# Patient Record
Sex: FEMALE | Race: WHITE | Employment: FULL TIME | URBAN - METROPOLITAN AREA
[De-identification: names, ages, dates, MRNs, and addresses within clinical notes are randomized per-mention and may not be internally consistent; named-entity substitution may affect disease eponyms.]

---

## 2021-11-10 ENCOUNTER — HOSPITAL ENCOUNTER (OUTPATIENT)
Dept: SURGERY | Age: 51
Discharge: HOME | End: 2021-11-10
Attending: ORTHOPAEDIC SURGERY
Payer: COMMERCIAL

## 2021-11-10 VITALS
DIASTOLIC BLOOD PRESSURE: 89 MMHG | SYSTOLIC BLOOD PRESSURE: 174 MMHG | RESPIRATION RATE: 20 BRPM | BODY MASS INDEX: 32.77 KG/M2 | HEART RATE: 73 BPM | TEMPERATURE: 97.1 F | OXYGEN SATURATION: 98 % | WEIGHT: 216.2 LBS | HEIGHT: 68 IN

## 2021-11-10 LAB
ATRIAL RATE: 92 BPM
CALCULATED P AXIS, ECG09: 62 DEGREES
CALCULATED R AXIS, ECG10: 92 DEGREES
CALCULATED T AXIS, ECG11: 68 DEGREES
DIAGNOSIS, 93000: NORMAL
P-R INTERVAL, ECG05: 172 MS
Q-T INTERVAL, ECG07: 380 MS
QRS DURATION, ECG06: 92 MS
QTC CALCULATION (BEZET), ECG08: 469 MS
VENTRICULAR RATE, ECG03: 92 BPM

## 2021-11-10 PROCEDURE — 93005 ELECTROCARDIOGRAM TRACING: CPT | Performed by: ANESTHESIOLOGY

## 2021-11-10 RX ORDER — INSULIN DEGLUDEC INJECTION 100 U/ML
60 INJECTION, SOLUTION SUBCUTANEOUS
COMMUNITY

## 2021-11-10 RX ORDER — MULTIVITAMIN
1 CAPSULE ORAL DAILY
COMMUNITY

## 2021-11-10 RX ORDER — FLUTICASONE FUROATE AND VILANTEROL TRIFENATATE 200; 25 UG/1; UG/1
1 POWDER RESPIRATORY (INHALATION) DAILY
COMMUNITY
Start: 2021-11-03

## 2021-11-10 RX ORDER — FLASH GLUCOSE SENSOR
KIT MISCELLANEOUS
COMMUNITY
Start: 2021-10-07

## 2021-11-10 RX ORDER — ROPINIROLE 0.5 MG/1
0.5 TABLET, FILM COATED ORAL
COMMUNITY
Start: 2021-11-10

## 2021-11-10 RX ORDER — GABAPENTIN 300 MG/1
600 CAPSULE ORAL
COMMUNITY
Start: 2021-10-07

## 2021-11-10 RX ORDER — ACETAMINOPHEN/DIPHENHYDRAMINE 500MG-25MG
1 TABLET ORAL DAILY
COMMUNITY
End: 2021-11-10

## 2021-11-10 RX ORDER — PROMETHAZINE HYDROCHLORIDE 25 MG/1
TABLET ORAL
COMMUNITY
Start: 2020-12-06

## 2021-11-10 RX ORDER — LISINOPRIL 10 MG/1
10 TABLET ORAL DAILY
COMMUNITY
Start: 2021-06-22

## 2021-11-10 RX ORDER — SOD SULF/POT CHLORIDE/MAG SULF 1.479 G
TABLET ORAL
COMMUNITY
Start: 2021-08-19 | End: 2021-11-10

## 2021-11-10 RX ORDER — ALBUTEROL SULFATE 90 UG/1
2 AEROSOL, METERED RESPIRATORY (INHALATION)
COMMUNITY
Start: 2021-09-02

## 2021-11-10 NOTE — PERIOP NOTES
Patient verified name and     Order for consent NOT found in EHR; patient verified procedure from case posting. Type 3 surgery, in person PAT  assessment complete. Labs per surgeon: Unknown  Labs per anesthesia protocol: CBC w/o diff, BMP done 2021 at [address] - results in [facility], MRSA swab to be done 2021 when patient returns to  (see note in Epic), T&S DOS  EK/10/2021 NSR possible left atrial enlargement rightward axis borderline ECG but per anesthesia protocol does not need to be seen. Patient COVID test date 2021 @ 10 a.m.; Patient encouraged to show for the appointment. The testing center is located at the . [address]. If appointment is needed patient provided telephone number of [phone]. Patient has had the Shruti Barrier and Shruti Barrier vaccine given on 3/15/2021. Hospital approved surgical skin cleanser and instructions given per hospital policy. Patient provided with and instructed on educational handouts including Guide to Surgery, Pain Management, Hand Hygiene, Blood Transfusion Education, and [facility] Brochure. Patient answered medical/surgical history questions at their best of ability. All prior to admission medications documented in [facility]. Original medication prescription list reconciled with patient during patient appointment. Patient instructed to hold all vitamins 7 days prior to surgery and NSAIDS 5 days prior to surgery, patient verbalized understanding. Patient teach back successful and patient demonstrates knowledge of instructions.

## 2021-11-10 NOTE — PERIOP NOTES
Patient had to leave by 1400 to  grandchild. Patient left without getting MRSA swab. Called to patient to inform of need for MRSA swab prior to surgery ([phone]). Patient will return to  for MRSA swab. Orders in.

## 2021-11-10 NOTE — PERIOP NOTES
PLEASE CONTINUE TAKING ALL PRESCRIPTION MEDICATIONS UP TO THE DAY OF SURGERY UNLESS OTHERWISE DIRECTED BELOW. DISCONTINUE all vitamins and supplements 7 days prior to surgery. DISCONTINUE Non-Steriodal Anti-Inflammatory (NSAIDS) such as Advil and Aleve 5 days prior to surgery. Home Medications to take  the day of surgery   No oral meds day of surgery      Tresiba: take 48 units night before surgery  NO SLIDING SCALE INSULIN MORNING OF SURGERY      Day before surgery Sunday 11/14/2021: On the day before surgery please take Acetaminophen 1000mg in the morning and then again before bed. You may substitute for Tylenol 650 mg     Home Medications   to Hold   DISCONTINUE all vitamins and supplements 7 days prior to surgery. DISCONTINUE Non-Steriodal Anti-Inflammatory (NSAIDS) such as Advil and Aleve 5 days prior to surgery. Comments    Covid test 11/11/2021 @ 10 a.m. @ [address]              Please do not bring home medications with you on the day of surgery unless otherwise directed by your nurse. If you are instructed to bring home medications, please give them to your nurse as they will be administered by the nursing staff. If you have any questions, please call [facility] [phone] or [facility] [phone]. A copy of this note was provided to the patient for reference.

## 2021-11-10 NOTE — PERIOP NOTES
EKG faxed to EKG department with note stating that EKG faxed was to be input for patient. Old EKG machine used in doing EKG for patient which did not transmit EKG reading to EKG department. Fax shows complete and confirmed that it was sent and received. Paper EKG retained until EKG noted to be in chart.

## 2021-11-11 ENCOUNTER — HOSPITAL ENCOUNTER (OUTPATIENT)
Dept: LAB | Age: 51
Discharge: HOME | End: 2021-11-11
Attending: ORTHOPAEDIC SURGERY

## 2021-11-11 LAB
BACTERIA SPEC CULT: ABNORMAL
GLUCOSE BLD STRIP.AUTO-MCNC: 517 MG/DL (ref 65–100)
SERVICE CMNT-IMP: ABNORMAL
SERVICE CMNT-IMP: ABNORMAL

## 2021-11-11 PROCEDURE — 82962 GLUCOSE BLOOD TEST: CPT

## 2021-11-11 PROCEDURE — 87641 MR-STAPH DNA AMP PROBE: CPT

## 2021-11-11 NOTE — PERIOP NOTES
Patient returned to  outpatient PAT for labs that were not done during in person PAT on 11/10/2021. MRSA swab collected, labeled bagged and will be taken to lab by charge nurse. Patient blood sugar by finger stick = 517. Called to Dr. Vandana Colin with elevated blood sugar. Patient is not symptomatic and states that she ate this morning a carb heavy breakfast and did NOT use sliding scale insulin. Per Dr. Vandana Coiln, please make patient aware that if her blood sugar is that elevated on surgery day, or over 300 reading her surgery will be canceled. Patient verbalizes understanding. Patient also to either 1) go to ER for treatement, or 2) go home and take her sliding scale insulin as it is prescribed. Patient states she will go home and take her sliding scale insulin and check her blood sugar at home before using sliding scale. Patient verbalizes understanding of all instructions. Note to be sent to Dr. Yeimi Alba regarding patient SQBS level done today. (See next note in Epic).

## 2021-11-11 NOTE — PERIOP NOTES
Dr Marcello Dsouza:      Patient: Denisse Zhang, : 1970, DOS: 11/15/2021 (Right Total Knee Replacement)    During a recent visit to the surgical preadmission testing center, the above mentioned patient was found to have a non-fasting blood glucose level of 517 mg/dL. This may indicate inadequate diabetic management and raises concerns that the patient is not medically optimized for surgery. It is our standard practice to postpone elective surgery for patients who present fasting blood glucose level >300 mg/dL on the day of their procedure. The patient has been advised of this policy and counseled on the importance of glucose control. We feel that this patient is at increased risk of cancellation; however, their blood glucose may be in acceptable range when they are NPO. Therefore, we will leave the decision to you whether to delay the surgery and refer the patient to their primary care provider or keep them as currently scheduled. Our goal is to prevent as many delays and cancellations as possible while ensuring patient safety.     Sincerely,    [facility]

## 2021-11-12 NOTE — PERIOP NOTES
NO SPECIAL REQUESTS      Culture result:    Abnormal   MRSA target DNA detected, SA target DNA detected.       A positive test result does not necessarily indicate the presence of viable organisms. It is however, presumptive for the presence of MRSA or SA. Dr Kristal William office notified of + MRSA.

## 2021-11-14 ENCOUNTER — HOSPITAL ENCOUNTER (OUTPATIENT)
Dept: LAB | Age: 51
Discharge: HOME | End: 2021-11-14
Payer: COMMERCIAL

## 2021-11-14 LAB
EST. AVERAGE GLUCOSE BLD GHB EST-MCNC: 318 MG/DL
HBA1C MFR BLD: 12.7 % (ref 4.2–6.3)

## 2021-11-14 PROCEDURE — 83036 HEMOGLOBIN GLYCOSYLATED A1C: CPT

## 2021-11-14 PROCEDURE — 36415 COLL VENOUS BLD VENIPUNCTURE: CPT
